# Patient Record
Sex: MALE | Race: BLACK OR AFRICAN AMERICAN | NOT HISPANIC OR LATINO | URBAN - METROPOLITAN AREA
[De-identification: names, ages, dates, MRNs, and addresses within clinical notes are randomized per-mention and may not be internally consistent; named-entity substitution may affect disease eponyms.]

---

## 2024-11-05 ENCOUNTER — HOSPITAL ENCOUNTER (EMERGENCY)
Facility: HOSPITAL | Age: 29
Discharge: HOME | End: 2024-11-06
Attending: EMERGENCY MEDICINE

## 2024-11-05 DIAGNOSIS — J10.1 INFLUENZA A: ICD-10-CM

## 2024-11-05 DIAGNOSIS — B34.9 ACUTE VIRAL SYNDROME: Primary | ICD-10-CM

## 2024-11-05 DIAGNOSIS — K52.9 GASTROENTERITIS: ICD-10-CM

## 2024-11-05 DIAGNOSIS — R06.6 INTRACTABLE HICCUPS: ICD-10-CM

## 2024-11-05 LAB
ALBUMIN SERPL BCP-MCNC: 4 G/DL (ref 3.5–5.2)
ALP SERPL-CCNC: 39 U/L (ref 55–135)
ALT SERPL W/O P-5'-P-CCNC: 17 U/L (ref 10–44)
ANION GAP SERPL CALC-SCNC: 9 MMOL/L (ref 8–16)
AST SERPL-CCNC: 25 U/L (ref 10–40)
BASOPHILS # BLD AUTO: 0.02 K/UL (ref 0–0.2)
BASOPHILS NFR BLD: 0.6 % (ref 0–1.9)
BILIRUB SERPL-MCNC: 0.5 MG/DL (ref 0.1–1)
BNP SERPL-MCNC: 15 PG/ML (ref 0–99)
BUN SERPL-MCNC: 5 MG/DL (ref 6–20)
CALCIUM SERPL-MCNC: 8.5 MG/DL (ref 8.7–10.5)
CHLORIDE SERPL-SCNC: 98 MMOL/L (ref 95–110)
CO2 SERPL-SCNC: 26 MMOL/L (ref 23–29)
CREAT SERPL-MCNC: 1.1 MG/DL (ref 0.5–1.4)
DIFFERENTIAL METHOD BLD: ABNORMAL
EOSINOPHIL # BLD AUTO: 0 K/UL (ref 0–0.5)
EOSINOPHIL NFR BLD: 0 % (ref 0–8)
ERYTHROCYTE [DISTWIDTH] IN BLOOD BY AUTOMATED COUNT: 12.5 % (ref 11.5–14.5)
EST. GFR  (NO RACE VARIABLE): >60 ML/MIN/1.73 M^2
GLUCOSE SERPL-MCNC: 113 MG/DL (ref 70–110)
HCT VFR BLD AUTO: 40.9 % (ref 40–54)
HGB BLD-MCNC: 14.5 G/DL (ref 14–18)
IMM GRANULOCYTES # BLD AUTO: 0 K/UL (ref 0–0.04)
IMM GRANULOCYTES NFR BLD AUTO: 0 % (ref 0–0.5)
LYMPHOCYTES # BLD AUTO: 1.3 K/UL (ref 1–4.8)
LYMPHOCYTES NFR BLD: 36.3 % (ref 18–48)
MAGNESIUM SERPL-MCNC: 1.7 MG/DL (ref 1.6–2.6)
MCH RBC QN AUTO: 30.5 PG (ref 27–31)
MCHC RBC AUTO-ENTMCNC: 35.5 G/DL (ref 32–36)
MCV RBC AUTO: 86 FL (ref 82–98)
MONOCYTES # BLD AUTO: 0.4 K/UL (ref 0.3–1)
MONOCYTES NFR BLD: 11.6 % (ref 4–15)
NEUTROPHILS # BLD AUTO: 1.8 K/UL (ref 1.8–7.7)
NEUTROPHILS NFR BLD: 51.5 % (ref 38–73)
NRBC BLD-RTO: 0 /100 WBC
PLATELET # BLD AUTO: 130 K/UL (ref 150–450)
PMV BLD AUTO: 11.6 FL (ref 9.2–12.9)
POTASSIUM SERPL-SCNC: 3.3 MMOL/L (ref 3.5–5.1)
PROT SERPL-MCNC: 6.9 G/DL (ref 6–8.4)
RBC # BLD AUTO: 4.75 M/UL (ref 4.6–6.2)
SODIUM SERPL-SCNC: 133 MMOL/L (ref 136–145)
TROPONIN I SERPL HS-MCNC: 3.6 PG/ML (ref 0–14.9)
WBC # BLD AUTO: 3.53 K/UL (ref 3.9–12.7)

## 2024-11-05 PROCEDURE — 96375 TX/PRO/DX INJ NEW DRUG ADDON: CPT

## 2024-11-05 PROCEDURE — 99285 EMERGENCY DEPT VISIT HI MDM: CPT | Mod: 25

## 2024-11-05 PROCEDURE — 84484 ASSAY OF TROPONIN QUANT: CPT | Performed by: EMERGENCY MEDICINE

## 2024-11-05 PROCEDURE — 87635 SARS-COV-2 COVID-19 AMP PRB: CPT | Performed by: EMERGENCY MEDICINE

## 2024-11-05 PROCEDURE — 83735 ASSAY OF MAGNESIUM: CPT | Performed by: EMERGENCY MEDICINE

## 2024-11-05 PROCEDURE — 87651 STREP A DNA AMP PROBE: CPT | Performed by: EMERGENCY MEDICINE

## 2024-11-05 PROCEDURE — 25000003 PHARM REV CODE 250: Performed by: EMERGENCY MEDICINE

## 2024-11-05 PROCEDURE — 96361 HYDRATE IV INFUSION ADD-ON: CPT

## 2024-11-05 PROCEDURE — 63600175 PHARM REV CODE 636 W HCPCS: Performed by: EMERGENCY MEDICINE

## 2024-11-05 PROCEDURE — 96374 THER/PROPH/DIAG INJ IV PUSH: CPT

## 2024-11-05 PROCEDURE — 87502 INFLUENZA DNA AMP PROBE: CPT | Performed by: EMERGENCY MEDICINE

## 2024-11-05 PROCEDURE — 80053 COMPREHEN METABOLIC PANEL: CPT | Performed by: EMERGENCY MEDICINE

## 2024-11-05 PROCEDURE — 83880 ASSAY OF NATRIURETIC PEPTIDE: CPT | Performed by: EMERGENCY MEDICINE

## 2024-11-05 PROCEDURE — 85025 COMPLETE CBC W/AUTO DIFF WBC: CPT | Performed by: EMERGENCY MEDICINE

## 2024-11-05 RX ORDER — ONDANSETRON HYDROCHLORIDE 2 MG/ML
8 INJECTION, SOLUTION INTRAVENOUS
Status: COMPLETED | OUTPATIENT
Start: 2024-11-05 | End: 2024-11-05

## 2024-11-05 RX ORDER — LIDOCAINE HYDROCHLORIDE 20 MG/ML
15 SOLUTION OROPHARYNGEAL ONCE
Status: COMPLETED | OUTPATIENT
Start: 2024-11-05 | End: 2024-11-05

## 2024-11-05 RX ORDER — ACETAMINOPHEN 500 MG
1000 TABLET ORAL
Status: COMPLETED | OUTPATIENT
Start: 2024-11-05 | End: 2024-11-05

## 2024-11-05 RX ORDER — ALUMINUM HYDROXIDE, MAGNESIUM HYDROXIDE, AND SIMETHICONE 1200; 120; 1200 MG/30ML; MG/30ML; MG/30ML
30 SUSPENSION ORAL ONCE
Status: COMPLETED | OUTPATIENT
Start: 2024-11-05 | End: 2024-11-05

## 2024-11-05 RX ORDER — CHLORPROMAZINE HYDROCHLORIDE 25 MG/1
25 TABLET, FILM COATED ORAL ONCE
Status: COMPLETED | OUTPATIENT
Start: 2024-11-05 | End: 2024-11-05

## 2024-11-05 RX ORDER — PANTOPRAZOLE SODIUM 40 MG/10ML
40 INJECTION, POWDER, LYOPHILIZED, FOR SOLUTION INTRAVENOUS
Status: COMPLETED | OUTPATIENT
Start: 2024-11-05 | End: 2024-11-05

## 2024-11-05 RX ADMIN — PANTOPRAZOLE SODIUM 40 MG: 40 INJECTION, POWDER, FOR SOLUTION INTRAVENOUS at 08:11

## 2024-11-05 RX ADMIN — ACETAMINOPHEN 1000 MG: 500 TABLET ORAL at 08:11

## 2024-11-05 RX ADMIN — CHLORPROMAZINE HYDROCHLORIDE 25 MG: 25 TABLET, FILM COATED ORAL at 08:11

## 2024-11-05 RX ADMIN — ONDANSETRON 8 MG: 2 INJECTION INTRAMUSCULAR; INTRAVENOUS at 08:11

## 2024-11-05 RX ADMIN — ALUMINUM HYDROXIDE, MAGNESIUM HYDROXIDE, AND SIMETHICONE 30 ML: 200; 200; 20 SUSPENSION ORAL at 08:11

## 2024-11-05 RX ADMIN — SODIUM CHLORIDE, POTASSIUM CHLORIDE, SODIUM LACTATE AND CALCIUM CHLORIDE 1000 ML: 600; 310; 30; 20 INJECTION, SOLUTION INTRAVENOUS at 09:11

## 2024-11-05 RX ADMIN — LIDOCAINE HYDROCHLORIDE 15 ML: 20 SOLUTION ORAL at 08:11

## 2024-11-06 VITALS
HEIGHT: 69 IN | WEIGHT: 178 LBS | DIASTOLIC BLOOD PRESSURE: 62 MMHG | SYSTOLIC BLOOD PRESSURE: 112 MMHG | TEMPERATURE: 98 F | RESPIRATION RATE: 19 BRPM | HEART RATE: 58 BPM | BODY MASS INDEX: 26.36 KG/M2 | OXYGEN SATURATION: 98 %

## 2024-11-06 LAB
GROUP A STREP, MOLECULAR: NEGATIVE
INFLUENZA A, MOLECULAR: POSITIVE
INFLUENZA B, MOLECULAR: NEGATIVE
OHS QRS DURATION: 86 MS
OHS QTC CALCULATION: 384 MS
SARS-COV-2 RDRP RESP QL NAA+PROBE: NEGATIVE
SPECIMEN SOURCE: ABNORMAL

## 2024-11-06 PROCEDURE — 25000003 PHARM REV CODE 250: Performed by: EMERGENCY MEDICINE

## 2024-11-06 RX ORDER — CHLORPROMAZINE HYDROCHLORIDE 25 MG/1
25 TABLET, FILM COATED ORAL 2 TIMES DAILY PRN
Qty: 20 TABLET | Refills: 1 | Status: SHIPPED | OUTPATIENT
Start: 2024-11-06 | End: 2024-11-26

## 2024-11-06 RX ORDER — POTASSIUM CHLORIDE 750 MG/1
50 CAPSULE, EXTENDED RELEASE ORAL ONCE
Status: COMPLETED | OUTPATIENT
Start: 2024-11-06 | End: 2024-11-06

## 2024-11-06 RX ORDER — FAMOTIDINE 20 MG/1
20 TABLET, FILM COATED ORAL 2 TIMES DAILY
Qty: 20 TABLET | Refills: 0 | Status: SHIPPED | OUTPATIENT
Start: 2024-11-06 | End: 2025-11-06

## 2024-11-06 RX ORDER — ONDANSETRON 4 MG/1
4 TABLET, FILM COATED ORAL EVERY 6 HOURS PRN
Qty: 20 TABLET | Refills: 1 | Status: SHIPPED | OUTPATIENT
Start: 2024-11-06 | End: 2025-11-06

## 2024-11-06 RX ADMIN — POTASSIUM CHLORIDE 50 MEQ: 750 CAPSULE, EXTENDED RELEASE ORAL at 12:11

## 2024-11-06 NOTE — DISCHARGE INSTRUCTIONS
Drink lots of fluids.  Rest.  Return to emergency department for worsening symptoms or any problems.  Take Tylenol for fever.  Return for worsening symptoms.

## 2024-11-06 NOTE — ED PROVIDER NOTES
Encounter Date: 11/5/2024       History     Chief Complaint   Patient presents with    Diarrhea     X 2 days, + fevers    Hiccups     X 4 days, + chest pain during episodes     29-year-old male presented emergency department with 4 days of hiccups and 2 days of diarrhea and nausea and vomiting.  Patient has episodes of burning with vomiting in the chest.  Denies dysuria or hematuria.  Denies any focal weakness or numbness.  Patient also noted to have fever upon arrival here today.      Review of patient's allergies indicates:  No Known Allergies  Past Medical History:   Diagnosis Date    Bipolar disorder, unspecified     Depression      History reviewed. No pertinent surgical history.  No family history on file.  Social History     Tobacco Use    Smoking status: Some Days     Types: Cigarettes    Smokeless tobacco: Never   Substance Use Topics    Alcohol use: Yes     Comment: Twice a week    Drug use: Yes     Types: Marijuana     Review of Systems   Constitutional:  Positive for fatigue and fever.   HENT: Negative.     Eyes: Negative.    Respiratory: Negative.     Cardiovascular:  Positive for chest pain.   Gastrointestinal:  Positive for diarrhea, nausea and vomiting.   Endocrine: Negative.    Genitourinary: Negative.    Musculoskeletal:  Positive for myalgias.   Skin: Negative.    Allergic/Immunologic: Negative.    Neurological: Negative.    Hematological: Negative.    Psychiatric/Behavioral: Negative.     All other systems reviewed and are negative.      Physical Exam     Initial Vitals [11/05/24 1901]   BP Pulse Resp Temp SpO2   118/68 78 16 (!) 101 °F (38.3 °C) 96 %      MAP       --         Physical Exam    Nursing note and vitals reviewed.  Constitutional: He appears well-developed and well-nourished.   HENT:   Head: Normocephalic and atraumatic.   Nose: Nose normal.   Eyes: Conjunctivae and EOM are normal.   Neck: No tracheal deviation present.   Normal range of motion.  Cardiovascular:  Normal rate, regular  rhythm, normal heart sounds and intact distal pulses.     Exam reveals no friction rub.       No murmur heard.  Pulmonary/Chest: Breath sounds normal. No respiratory distress. He has no wheezes. He has no rales.   Abdominal: Abdomen is soft. He exhibits no distension. There is no abdominal tenderness.   Mild epigastric discomfort with palpation There is no rebound and no guarding.   Musculoskeletal:         General: Normal range of motion.      Cervical back: Normal range of motion.     Neurological: He is alert and oriented to person, place, and time. He has normal strength. No sensory deficit. GCS score is 15. GCS eye subscore is 4. GCS verbal subscore is 5. GCS motor subscore is 6.   Skin: Skin is warm and dry. Capillary refill takes less than 2 seconds.   Psychiatric: He has a normal mood and affect. Thought content normal.         ED Course   Procedures  Labs Reviewed   CBC W/ AUTO DIFFERENTIAL - Abnormal       Result Value    WBC 3.53 (*)     RBC 4.75      Hemoglobin 14.5      Hematocrit 40.9      MCV 86      MCH 30.5      MCHC 35.5      RDW 12.5      Platelets 130 (*)     MPV 11.6      Immature Granulocytes 0.0      Gran # (ANC) 1.8      Immature Grans (Abs) 0.00      Lymph # 1.3      Mono # 0.4      Eos # 0.0      Baso # 0.02      nRBC 0      Gran % 51.5      Lymph % 36.3      Mono % 11.6      Eosinophil % 0.0      Basophil % 0.6      Differential Method Automated     COMPREHENSIVE METABOLIC PANEL - Abnormal    Sodium 133 (*)     Potassium 3.3 (*)     Chloride 98      CO2 26      Glucose 113 (*)     BUN 5 (*)     Creatinine 1.1      Calcium 8.5 (*)     Total Protein 6.9      Albumin 4.0      Total Bilirubin 0.5      Alkaline Phosphatase 39 (*)     AST 25      ALT 17      eGFR >60.0      Anion Gap 9     INFLUENZA A AND B ANTIGEN - Abnormal    Influenza A, Molecular Positive (*)     Influenza B, Molecular Negative      Flu A & B Source NP      Narrative:     Specimen Source->Nasopharyngeal Swab  Influenza A  critical result(s) called and verbal readback obtained   from Dimitrios Lara RN in ED  by KS3 11/06/2024 00:08   GROUP A STREP, MOLECULAR    Group A Strep, Molecular Negative     TROPONIN I HIGH SENSITIVITY    Troponin I High Sensitivity 3.6     B-TYPE NATRIURETIC PEPTIDE    BNP 15     MAGNESIUM    Magnesium 1.7     SARS-COV-2 RNA AMPLIFICATION, QUAL    SARS-CoV-2 RNA, Amplification, Qual Negative       EKG Readings: (Independently Interpreted)   Initial Reading: No STEMI. Rhythm: Normal Sinus Rhythm. Ectopy: No Ectopy. Conduction: Normal.       Imaging Results              X-Ray Chest AP Portable (Final result)  Result time 11/05/24 21:25:24      Final result by William Chirinos DO (11/05/24 21:25:24)                   Impression:      No acute abnormality.      Electronically signed by: William Chirinos  Date:    11/05/2024  Time:    21:25               Narrative:    EXAMINATION:  XR CHEST AP PORTABLE    CLINICAL HISTORY:  CHF;    TECHNIQUE:  Single frontal view of the chest was performed.    COMPARISON:  None    FINDINGS:  The lungs are well expanded and clear. No focal opacities are seen. The pleural spaces are clear. The cardiac silhouette is unremarkable. The visualized osseous structures are unremarkable.                                       Medications   potassium chloride CR capsule 50 mEq (has no administration in time range)   pantoprazole injection 40 mg (40 mg Intravenous Given 11/5/24 2015)   ondansetron injection 8 mg (8 mg Intravenous Given 11/5/24 2015)   aluminum-magnesium hydroxide-simethicone 200-200-20 mg/5 mL suspension 30 mL (30 mLs Oral Given 11/5/24 2023)     And   LIDOcaine viscous HCl 2% oral solution 15 mL (15 mLs Oral Given 11/5/24 2023)   chlorproMAZINE tablet 25 mg (25 mg Oral Given 11/5/24 2056)   acetaminophen tablet 1,000 mg (1,000 mg Oral Given 11/5/24 2036)   lactated ringers bolus 1,000 mL (0 mLs Intravenous Stopped 11/5/24 2255)     Medical Decision Making  29-year-old with 4 days  of hiccups and then 2 days later started having nausea vomiting.  Patient has generalized malaise and fatigue and low-grade fevers.  Patient has symptoms for about 4 days.  Tested positive for influenza A.  Screening labs and workup otherwise unremarkable.  Hypokalemia treated.  Patient hydrated and patient felt better after that.  Take ups resolved with the Thorazine.  As feeling well discharged with return precautions and instructions and follow-up.  Patient is symptomatic for at least 4 days so would not be a candidate to receive Tamiflu.  As patient did have significant improvement of symptoms supportive care and symptomatic treatment and discharged with the instructions and follow-up with primary care.    Amount and/or Complexity of Data Reviewed  Labs: ordered. Decision-making details documented in ED Course.  Radiology: ordered. Decision-making details documented in ED Course.  ECG/medicine tests: ordered and independent interpretation performed. Decision-making details documented in ED Course.    Risk  OTC drugs.  Prescription drug management.                                      Clinical Impression:  Final diagnoses:  [B34.9] Acute viral syndrome (Primary)  [J10.1] Influenza A  [K52.9] Gastroenteritis  [R06.6] Intractable hiccups          ED Disposition Condition    Discharge Stable          ED Prescriptions       Medication Sig Dispense Start Date End Date Auth. Provider    famotidine (PEPCID) 20 MG tablet Take 1 tablet (20 mg total) by mouth 2 (two) times daily. 20 tablet 11/6/2024 11/6/2025 Faye Grimaldo MD    ondansetron (ZOFRAN) 4 MG tablet Take 1 tablet (4 mg total) by mouth every 6 (six) hours as needed. 20 tablet 11/6/2024 11/6/2025 Faye Grimaldo MD    chlorproMAZINE (THORAZINE) 25 MG tablet Take 1 tablet (25 mg total) by mouth 2 (two) times daily as needed (Hiccups). 20 tablet 11/6/2024 11/26/2024 Faye Grimaldo MD          Follow-up Information       Follow up With Specialties Details Why Contact Info     [facility]  In [facility] days  [address]  [facility] [address]  [phone]               Faye Grimaldo MD  11/06/24 0016